# Patient Record
Sex: FEMALE | Race: ASIAN | NOT HISPANIC OR LATINO
[De-identification: names, ages, dates, MRNs, and addresses within clinical notes are randomized per-mention and may not be internally consistent; named-entity substitution may affect disease eponyms.]

---

## 2023-07-10 ENCOUNTER — APPOINTMENT (OUTPATIENT)
Dept: ORTHOPEDIC SURGERY | Facility: CLINIC | Age: 18
End: 2023-07-10
Payer: MEDICAID

## 2023-07-10 VITALS — HEIGHT: 63 IN | BODY MASS INDEX: 21.79 KG/M2 | WEIGHT: 123 LBS

## 2023-07-10 PROBLEM — Z00.00 ENCOUNTER FOR PREVENTIVE HEALTH EXAMINATION: Status: ACTIVE | Noted: 2023-07-10

## 2023-07-10 PROCEDURE — 99204 OFFICE O/P NEW MOD 45 MIN: CPT

## 2023-07-10 NOTE — HISTORY OF PRESENT ILLNESS
[de-identified] : Initial Visit for Left Knee\par Reason: Taekwondo Injury \par Duration: 02/2023 - since then she re-injured the site 2x \par Prior Studies: 04/11/2023 MRI - Photo copy / Report available / 03/2023 - Xray available on mobile phone - [facility] \par Aggravating FX: STanding \par Alleviating FX: Ice pack / elevation\par Pain Medication:\par NKDA

## 2023-07-10 NOTE — PHYSICAL EXAM
[de-identified] : Left knee\par \par Constitutional: \par The patient is healthy-appearing and in no apparent distress. \par \par Gait:\par The patient ambulates with a normal gait and no limp.\par \par Cardiovascular System: \par The capillary refill is less than 2 seconds. \par \par Skin: \par There are no skin abnormalities.\par \par Left Knee:\par  \par Bony Palpation: \par There is no tenderness of the medial joint line. \par There is no tenderness of the lateral joint line.\par There is no tenderness of the medial femoral chondyle.\par There is no tenderness of the lateral femoral chondyle.\par There is no tenderness of the tibial tubercle.\par There is no tenderness of the superior patella.\par There is no tenderness of the inferior patella.\par There is no tenderness of the medial patellar facet.\par There is no tenderness of the lateral patellar facet.\par \par Soft Tissue Palpation: \par There is no tenderness of the medial retinaculum.\par There is no tenderness of the lateral retinaculum.\par There is no tenderness of the quadriceps tendon.\par There is no tenderness of the patella tendon.\par There is no tenderness of the ITB.\par There is no tenderness of the pes anserine.\par \par Active Range of Motion: \par The range of motion at the knee actively and passively is full. \par \par Special Tests: \par There is a negative Apley.\par There is a negative Steinmanns. \par There is a POSITIVE Lachman and Anterior Drawer.\par There is a negative Posterior Drawer.  \par There is no varus or valgus laxity.\par \par Strength: \par There is 5/5 hip flexion and 5/5 knee flexion and extension.  \par \par Psychiatric: \par The patient demonstrates a normal mood and affect and is active and alert\par \par  [de-identified] : MRI Left knee ([place]):  There is a complete tear of the ACL

## 2023-07-20 RX ORDER — OXYCODONE AND ACETAMINOPHEN 5; 325 MG/1; MG/1
5-325 TABLET ORAL
Qty: 40 | Refills: 0 | Status: ACTIVE | COMMUNITY
Start: 2023-07-20 | End: 1900-01-01

## 2023-07-21 ENCOUNTER — APPOINTMENT (OUTPATIENT)
Dept: ORTHOPEDIC SURGERY | Facility: HOSPITAL | Age: 18
End: 2023-07-21
Payer: MEDICAID

## 2023-07-21 PROCEDURE — 29888 ARTHRS AID ACL RPR/AGMNTJ: CPT | Mod: LT

## 2023-07-25 ENCOUNTER — APPOINTMENT (OUTPATIENT)
Dept: ORTHOPEDIC SURGERY | Facility: CLINIC | Age: 18
End: 2023-07-25
Payer: MEDICAID

## 2023-07-25 PROCEDURE — 99024 POSTOP FOLLOW-UP VISIT: CPT

## 2023-07-30 ENCOUNTER — TRANSCRIPTION ENCOUNTER (OUTPATIENT)
Age: 18
End: 2023-07-30

## 2023-07-31 ENCOUNTER — APPOINTMENT (OUTPATIENT)
Dept: ORTHOPEDIC SURGERY | Facility: CLINIC | Age: 18
End: 2023-07-31
Payer: MEDICAID

## 2023-07-31 PROCEDURE — 99024 POSTOP FOLLOW-UP VISIT: CPT

## 2023-07-31 NOTE — HISTORY OF PRESENT ILLNESS
[de-identified] : s/p LEFT knee ACL reconstruction (hamstring Graftlink) [de-identified] : Patient is 4 days postop.  States pain controlled.  States taking daily ASA [de-identified] : LEFT knee:  Hinged brace intact.  ROM 0 - 125.  Negative Lachman.  Negative Homans.  Moderate effusion.  Wounds CDI.  [de-identified] : s/p LEFT knee ACL reconstruction (hamstring Graftlink) [de-identified] : Discussed postop protocols and patient to begin PT.

## 2023-08-14 ENCOUNTER — APPOINTMENT (OUTPATIENT)
Dept: ORTHOPEDIC SURGERY | Facility: CLINIC | Age: 18
End: 2023-08-14
Payer: MEDICAID

## 2023-08-14 PROCEDURE — 99024 POSTOP FOLLOW-UP VISIT: CPT

## 2023-08-14 NOTE — HISTORY OF PRESENT ILLNESS
[de-identified] : s/p LEFT knee ACL reconstruction (hamstring Graftlink) [de-identified] : Patient is 3.5 weeks postop.  States pain controlled.  States PT is going well [de-identified] : LEFT knee:  ROM 0 - 135.  Negative Lachman.  Negative Homans.  Mild effusion.  Wounds CDI.  [de-identified] : s/p LEFT knee ACL reconstruction (hamstring Graftlink) [de-identified] : Discussed postop protocols and patient to begin PT.  Advance PT and ROM.  Follow-up in 4 weeks

## 2023-10-03 ENCOUNTER — APPOINTMENT (OUTPATIENT)
Dept: ORTHOPEDIC SURGERY | Facility: CLINIC | Age: 18
End: 2023-10-03
Payer: MEDICAID

## 2023-10-03 PROCEDURE — 99024 POSTOP FOLLOW-UP VISIT: CPT

## 2024-02-01 ENCOUNTER — APPOINTMENT (OUTPATIENT)
Dept: ORTHOPEDIC SURGERY | Facility: CLINIC | Age: 19
End: 2024-02-01
Payer: MEDICAID

## 2024-02-01 VITALS — WEIGHT: 123 LBS | HEIGHT: 63 IN | BODY MASS INDEX: 21.79 KG/M2

## 2024-02-01 DIAGNOSIS — M70.62 TROCHANTERIC BURSITIS, LEFT HIP: ICD-10-CM

## 2024-02-01 DIAGNOSIS — S83.512A SPRAIN OF ANTERIOR CRUCIATE LIGAMENT OF LEFT KNEE, INITIAL ENCOUNTER: ICD-10-CM

## 2024-02-01 PROCEDURE — 99213 OFFICE O/P EST LOW 20 MIN: CPT

## 2024-02-01 NOTE — HISTORY OF PRESENT ILLNESS
[de-identified] : Patient states she is having pain on the lateral aspect of her hip and proximal thigh after doing some exercise classes for 2 days with squatting.  Denies any knee pain ymptoms: Pinching pain in sit to stand position / while doing exercise( cardio) felt pain afterwards  Pain level: 3/10 no physical therapy no home exercises

## 2024-02-01 NOTE — ASSESSMENT
[FreeTextEntry1] : Discussed at length with patient overall clinical improvement regards to her left knee and symptoms currently consistent with mild GT bursitis and IT band tightness she will begin home exercise and therapy with activity restrictions were discussed and she will follow-up if persistent symptoms

## 2024-02-01 NOTE — PHYSICAL EXAM
[de-identified] : Left hip  Constitutional:  The patient is healthy-appearing and in no apparent distress.   Gait: The patient ambulates with a normal gait and no limp.  Cardiovascular System:  There is capillary refill less than 2 seconds.   Skin:  There is no skin abnormalities.  Lumbar Spine; There is no significance malalignment and no tenderness to the paraspinal musculature.  Left hip:  Bony Palpation:  There is no tenderness of the iliac crest. There is no tenderness of the ASIS. There is no tenderness of the PSIS. There is no tenderness of the SI joint. There is tenderness of the greater trochanter.   Soft Tissue Palpation:  There is no tenderness of the hip adductors. There is no tenderness of the hip abductors. There is no tenderness of the hamstrings.  There is no tenderness of the piriformis.  There is no tenderness of the hip flexors.  Active Range of Motion:  There is full range of motion at the hip actively and passively.   Special Tests:  There is a positive Lyn's test. There is a negative Bharath-Rianna test.   Strength:  There is 5/5 hip flexion, adduction, abduction.    Left knee  Gait: The patient ambulates with a normal gait and no limp.  Cardiovascular System:  The capillary refill is less than 2 seconds.   Skin:  There is mild quad atrophy  Left Knee:   Bony Palpation:  There is no tenderness of the medial joint line.  There is no tenderness of the lateral joint line. There is no tenderness of the medial femoral chondyle. There is no tenderness of the lateral femoral chondyle. There is no tenderness of the tibial tubercle. There is no tenderness of the superior patella. There is no tenderness of the inferior patella. There is no tenderness of the medial patellar facet. There is no tenderness of the lateral patellar facet.  Soft Tissue Palpation:  There is no tenderness of the medial retinaculum. There is no tenderness of the lateral retinaculum. There is no tenderness of the quadriceps tendon. There is no tenderness of the patella tendon. There is no tenderness of the ITB. There is no tenderness of the pes anserine.  Active Range of Motion:  The range of motion at the knee actively and passively is full.   Special Tests:  There is a negative Apley. There is a negative Steinmanns.  There is a negative Lachman and Anterior Drawer. There is a negative Posterior Drawer.   There is no varus or valgus laxity.  Strength:  There is 5/5 hip flexion and 5/5 knee flexion and extension.    Psychiatric:  The patient demonstrates a normal mood and affect and is active and alert